# Patient Record
(demographics unavailable — no encounter records)

---

## 2024-11-20 NOTE — DISCUSSION/SUMMARY
[Optimized for Surgery] : the patient is optimized for surgery [FreeTextEntry1] : Pre Operative Issues:  The patient has atypical cells on urine sample.  The patient's cardiovascular symptoms have been essentially unchanged for many years.  Previous evaluation has been essentially been negative.  Overall feel that risk-benefit favors proceeding with cystoscopy and any needed biopsy immediately and without delay.  All of her cardiac testing can be done after cystoscopy and  biopsy.  The patient is maximized for the planned procedure.  Proceed without delay.  Keep input equal to output.  Standard DVT prophylaxis is recommended.  No aspirin or nonsteroidal anti-inflammatory drugs for 5 days preoperatively.

## 2024-11-20 NOTE — HISTORY OF PRESENT ILLNESS
[Scheduled Procedure ___] : a [unfilled] [FreeTextEntry1] : The patient is seen because of chest pain shortness of breath and palpitations.  The patient is in need of preoperative valuation prior to cystoscopy.  Previous evaluation has been essentially negative.  The patient continues to have on and off chest discomfort.  She describes a heavy or tight like sensation.  It lasts for a minute or 2 at a time.  There is no relation to exertion.  The patient continues to have on and off palpitations.  There have been 3 episodes in the last month.  About a year ago she had an episode of lightheadedness.  No recurrent lightheadedness.  No syncope.

## 2024-11-20 NOTE — ASSESSMENT
[FreeTextEntry1] : Chest discomfort: Seems atypical for coronary artery disease.  Exercise stress testing has been arranged to rule out ischemia.  Palpitations: 2-week event monitoring has been arranged to make a diagnosis.  Shortness of breath: The patient has asthma.  She believes her shortness of breath is secondary to asthma.  Transthoracic echocardiography has been arranged to make certain that there is no evidence of pericardial disease.

## 2025-02-20 NOTE — REASON FOR VISIT
[FreeTextEntry1] : Devi has a past medical history of CVA, visual changes, hand paresthesias, HTN, HL ,palpitations, atypical chest pain  No history of CAD, MI, revascularization, VHD, CHF, TIA, CVA, diabetes, PVD, DVT, PE, arrhythmia, AF.  Patient has dyspnea with moderate exertion.  Cardiovascular review of symptoms is negative for exertional chest pain, dyspnea, palpitations, dizziness or syncope.  No PND or orthopnea leg edema.  No bleeding or black stool.  No exercise routine.  Patient is walking 10 minutes on occasion

## 2025-02-20 NOTE — REVIEW OF SYSTEMS
[Blurry Vision] : blurred vision [Dyspnea on exertion] : dyspnea during exertion [Tingling (Paresthesia)] : tingling [Negative] : Heme/Lymph

## 2025-02-20 NOTE — ASSESSMENT
[FreeTextEntry1] : Patient has medical history detailed above and active medical issues including:  - Remote history of lacunar CVA, visual changes, paresthesia  - HTN, average resting BPs at guideline goal on Procardia, metoprolol  - Hyperlipidemia on atorvastatin 10 Mg daily well-tolerated  - Hematuria followed by urologist  Advised patient to follow active lifestyle with regular cardiovascular exercise. Patient educated on heart healthy diet. Recommend increased oral hydration with electrolyte supplement drinks, avoid excess alcohol and caffeine.  Patient is aware to call with any symptoms or concerns.   Cardiology follow-up after noninvasive testing.  Devi will follow-up with Dr. Dylan Gonzalez for primary care  Total time spent 45 minutes, reviewing of test results, chart information, patient discussion, physical exam and completion of chart documentation.

## 2025-02-24 NOTE — DISCUSSION/SUMMARY
[FreeTextEntry1] : 69-year-old female with:  - Remote history of lacunar CVA, visual changes, paresthesia - HTN: resting BPs at guideline goal on Procardia, metoprolol.  Should take metoprolol half a tablet in a.m. - Hyperlipidemia on atorvastatin 10 Mg daily well-tolerated.  Dose may have to be increased if LDL is more than 85. - Hematuria followed by urologist -Exercise stress test January 2025: Exercised 6 minutes [place] protocol.  No angina or ischemia.   -Echocardiogram December 2024: Normal LV function and wall motion.  Normal diastolic function.  Normal PASP  -ZIO recording: Less than 1% PAC and PVC burden.  Episodes of atrial tachycardia -longest was 6 beats.  She had symptoms with sinus beat, PACs and PVCs.  Encouraged her to take metoprolol every day.  Unable to increase dose -patient educated on heart healthy diet. Recommend increased oral hydration with electrolyte supplement drinks, avoid excess alcohol and caffeine.  Patient is aware to call with any symptoms or concerns.   Devi will follow-up with Dr. Dylan Gonzalez for primary care Sincerely, Dr. Toby MD, [facility], [facility]

## 2025-02-24 NOTE — REASON FOR VISIT
[FreeTextEntry1] : Devi has a past medical history of CVA, visual changes, hand paresthesias, HTN, HL , palpitations and anxiety  No history of CAD, MI, revascularization, VHD, CHF, TIA, CVA, diabetes, PVD, DVT, PE, arrhythmia, AF.  Patient has dyspnea with moderate exertion.  Cardiovascular review of symptoms is negative for exertional chest pain, dizziness or syncope.  No PND or orthopnea leg edema.  No bleeding or black stool.  No exercise routine.  Patient walks 10 minutes on occasion  She had echo, ETT and ZIO recorder which are reported below